# Patient Record
Sex: FEMALE | URBAN - METROPOLITAN AREA
[De-identification: names, ages, dates, MRNs, and addresses within clinical notes are randomized per-mention and may not be internally consistent; named-entity substitution may affect disease eponyms.]

---

## 2019-02-08 ENCOUNTER — HOSPITAL ENCOUNTER (OUTPATIENT)
Age: 59
Discharge: HOME | End: 2019-02-10
Payer: COMMERCIAL

## 2019-02-08 PROCEDURE — 88304 TISSUE EXAM BY PATHOLOGIST: CPT

## 2019-02-08 PROCEDURE — 88311 DECALCIFY TISSUE: CPT

## 2019-02-08 PROCEDURE — 88305 TISSUE EXAM BY PATHOLOGIST: CPT

## 2024-05-22 ENCOUNTER — OFFICE VISIT (OUTPATIENT)
Dept: ORTHOPEDIC SURGERY | Age: 64
End: 2024-05-22
Payer: COMMERCIAL

## 2024-05-22 VITALS — BODY MASS INDEX: 36.71 KG/M2 | HEIGHT: 60 IN | WEIGHT: 187 LBS

## 2024-05-22 DIAGNOSIS — M75.51 BURSITIS OF RIGHT SHOULDER: ICD-10-CM

## 2024-05-22 DIAGNOSIS — G89.29 CHRONIC RIGHT SHOULDER PAIN: Primary | ICD-10-CM

## 2024-05-22 DIAGNOSIS — M25.511 CHRONIC RIGHT SHOULDER PAIN: Primary | ICD-10-CM

## 2024-05-22 DIAGNOSIS — M25.811 IMPINGEMENT OF RIGHT SHOULDER: ICD-10-CM

## 2024-05-22 PROCEDURE — 99203 OFFICE O/P NEW LOW 30 MIN: CPT | Performed by: ORTHOPAEDIC SURGERY

## 2024-05-22 RX ORDER — MAGNESIUM 30 MG
30 TABLET ORAL 2 TIMES DAILY
COMMUNITY

## 2024-05-22 RX ORDER — LOSARTAN POTASSIUM 50 MG/1
50 TABLET ORAL DAILY
COMMUNITY
Start: 2024-03-02

## 2024-05-22 RX ORDER — MULTIVIT WITH MINERALS/LUTEIN
250 TABLET ORAL DAILY
COMMUNITY

## 2024-05-22 RX ORDER — ASPIRIN 81 MG/1
81 TABLET, CHEWABLE ORAL DAILY
COMMUNITY

## 2024-05-22 RX ORDER — CHOLECALCIFEROL (VITAMIN D3) 1250 MCG
CAPSULE ORAL
COMMUNITY

## 2024-05-22 NOTE — PROGRESS NOTES
Ny Blanco is a 63 y.o. female, who presents   Chief Complaint   Patient presents with    Shoulder Pain     Right Shoulder x 1 year, no known recent injury, was seen in [facility] a year ago, no previous right shoulder surgery or neck surgery.  Pain with use of arm.         HPI:: Right shoulder pains been present for about 10 months.  There is no history of specific injury.  Ny is right-hand dominant.  She got worse in August 2023 and was actually waking because of the pain.  She went to ER at [facility] in August and was evaluated there and told she had some spurs and degeneration in her shoulder.  There is really been no follow-up.      Allergies; medications; past medical, surgical, family, and social history; and problem list have been reviewed today and updated as indicated in this encounter - see below following Ortho specifics.    Musculoskeletal: Skin condition and gross neurovascular functions good in the right upper extremity.  Elbow wrist and hand motion are all good with no instability or pain.  There is mild tenderness to palpation along the lateral margin of the acromion.  There is also some discomfort with range of motion in the shoulder and slight crepitus with elevation and rotation.  There is no instability of AC or glenohumeral joints.  Her rotator cuff strength seems grossly good.  There is no suggestion of long head biceps tendinitis or tendon rupture.    Radiologic Studies: Imaging of the right shoulder shows very mild inferior glenoid rim osteophyte formation as well as the same on the greater tuberosity of the humerus.  The subacromial space is good.  The humeral head is located well in the glenoid.  There are no large projections from the anterior inferior acromion.    ASSESSMENT:  Ny was seen today for shoulder pain.    Diagnoses and all orders for this visit:    Chronic right shoulder pain  -     XR SHOULDER RIGHT (MIN 2 VIEWS); Future    Bursitis of right shoulder    Impingement of right